# Patient Record
Sex: FEMALE | Race: BLACK OR AFRICAN AMERICAN | NOT HISPANIC OR LATINO | URBAN - METROPOLITAN AREA
[De-identification: names, ages, dates, MRNs, and addresses within clinical notes are randomized per-mention and may not be internally consistent; named-entity substitution may affect disease eponyms.]

---

## 2023-12-13 ENCOUNTER — HOSPITAL ENCOUNTER (EMERGENCY)
Facility: HOSPITAL | Age: 23
Discharge: HOME | End: 2023-12-13
Payer: COMMERCIAL

## 2023-12-13 VITALS
TEMPERATURE: 97.5 F | HEIGHT: 68 IN | HEART RATE: 75 BPM | RESPIRATION RATE: 16 BRPM | DIASTOLIC BLOOD PRESSURE: 100 MMHG | WEIGHT: 293 LBS | BODY MASS INDEX: 44.41 KG/M2 | OXYGEN SATURATION: 99 % | SYSTOLIC BLOOD PRESSURE: 154 MMHG

## 2023-12-13 DIAGNOSIS — S39.012D STRAIN OF LUMBAR REGION, SUBSEQUENT ENCOUNTER: Primary | ICD-10-CM

## 2023-12-13 PROCEDURE — 99282 EMERGENCY DEPT VISIT SF MDM: CPT

## 2023-12-13 PROCEDURE — 99283 EMERGENCY DEPT VISIT LOW MDM: CPT

## 2023-12-13 PROCEDURE — 99284 EMERGENCY DEPT VISIT MOD MDM: CPT | Performed by: PHYSICIAN ASSISTANT

## 2023-12-13 RX ORDER — KETOROLAC TROMETHAMINE 30 MG/ML
30 INJECTION, SOLUTION INTRAMUSCULAR; INTRAVENOUS ONCE
Status: DISCONTINUED | OUTPATIENT
Start: 2023-12-13 | End: 2023-12-13 | Stop reason: HOSPADM

## 2023-12-13 RX ORDER — LIDOCAINE 50 MG/G
1 PATCH TOPICAL DAILY
Qty: 5 PATCH | Refills: 0 | Status: SHIPPED | OUTPATIENT
Start: 2023-12-13

## 2023-12-13 ASSESSMENT — COLUMBIA-SUICIDE SEVERITY RATING SCALE - C-SSRS
2. HAVE YOU ACTUALLY HAD ANY THOUGHTS OF KILLING YOURSELF?: NO
6. HAVE YOU EVER DONE ANYTHING, STARTED TO DO ANYTHING, OR PREPARED TO DO ANYTHING TO END YOUR LIFE?: NO
1. IN THE PAST MONTH, HAVE YOU WISHED YOU WERE DEAD OR WISHED YOU COULD GO TO SLEEP AND NOT WAKE UP?: NO

## 2023-12-13 ASSESSMENT — PAIN SCALES - GENERAL: PAINLEVEL_OUTOF10: 10 - WORST POSSIBLE PAIN

## 2023-12-13 ASSESSMENT — PAIN - FUNCTIONAL ASSESSMENT: PAIN_FUNCTIONAL_ASSESSMENT: 0-10

## 2023-12-13 ASSESSMENT — PAIN DESCRIPTION - LOCATION: LOCATION: BACK

## 2023-12-13 ASSESSMENT — PAIN DESCRIPTION - PAIN TYPE: TYPE: ACUTE PAIN

## 2023-12-13 NOTE — ED TRIAGE NOTES
Pt to ED with c/o lower back pain. 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 Seen at [facility] and dc home with muscle relaxers. Still having low back pain. 10/10

## 2023-12-13 NOTE — ED PROVIDER NOTES
HPI   Chief Complaint   Patient presents with    Back Pain       HPI  This is a 23-year-old female who was injured at work last week.  It was last Friday.  She is was seen at the [facility] for the injury after she was brought by squad to [facility].  She was given imaging Naprosyn and Flexeril.  She states it felt like is getting little better but every time she twisted hurts.  She does not have a follow-up appointment with them until 2 weeks.  She has not been at work since then.  She has not done any other follow-up.  She denies any urinary changes denies being pregnant.  No chest pain shortness of breath cough cold fever numbness tingling or weakness.  Hurts mostly when she moves.                  [place] Coma Scale Score: 15                  Patient History   Past Medical History:   Diagnosis Date    38 weeks gestation of pregnancy 06/10/2019    38 weeks gestation of pregnancy    Anemia complicating pregnancy, third trimester 05/15/2019    Anemia during pregnancy in third trimester    Chlamydial infection, unspecified 10/23/2015    Chlamydia    Encounter for  screening for Streptococcus B 2019     screening for streptococcus B    Encounter for examination of blood pressure without abnormal findings 2019    Encounter for blood pressure examination    Encounter for other specified surgical aftercare 2019    Encounter for postoperative wound check    Encounter for other specified surgical aftercare 2016    Encounter for postoperative wound check    Encounter for routine postpartum follow-up 2019    Postpartum exam    Encounter for supervision of normal pregnancy, unspecified, unspecified trimester 2019    Encounter for prenatal care    Encounter for supervision of other normal pregnancy, third trimester 05/15/2019    Encounter for supervision of other normal pregnancy in third trimester    Encounter for supervision of other normal pregnancy,  unspecified trimester 2016    Pregnancy, subsequent    Excessive weight gain in pregnancy, unspecified trimester 2016    Excessive weight gain in pregnancy    Excessive weight gain in pregnancy, unspecified trimester 2016    Excessive weight gain in pregnancy    Maternal care for other (suspected) fetal abnormality and damage, fetal cardiac anomalies, not applicable or unspecified 2019    Abnormal fetal echocardiogram affecting antepartum care of mother    Obesity complicating pregnancy, unspecified trimester 2019    Obesity in pregnancy    Personal history of diseases of the blood and blood-forming organs and certain disorders involving the immune mechanism 2019    History of anemia    Personal history of other infectious and parasitic diseases 2016    History of chlamydia infection    Personal history of other medical treatment     History of blood transfusion    Personal history of other mental and behavioral disorders 2015    History of attention deficit hyperactivity disorder (ADHD)    Personal history of other specified conditions 10/23/2015    History of urinary frequency    Supervision of high risk pregnancy, unspecified, second trimester 2016    Supervision of high risk pregnancy in second trimester    Supervision of high risk pregnancy, unspecified, third trimester 2019    Encounter for supervision of high risk pregnancy in third trimester, antepartum    Unspecified maternal hypertension, unspecified trimester 2016    Hypertension in pregnancy, delivered    Unspecified maternal hypertension, unspecified trimester 2016    Hypertension in pregnancy, antepartum    Unspecified pre-existing hypertension complicating pregnancy, unspecified trimester 06/10/2019    Chronic hypertension during pregnancy, antepartum     Past Surgical History:   Procedure Laterality Date     SECTION, CLASSIC  2019     Section     No family  history on file.  Social History     Tobacco Use    Smoking status: Never    Smokeless tobacco: Never   Substance Use Topics    Alcohol use: Not on file    Drug use: Not on file       Physical Exam   ED Triage Vitals [12/13/23 0947]   Temp Heart Rate Resp BP   36.4 °C (97.5 °F) 75 16 (!) 154/100      SpO2 Temp Source Heart Rate Source Patient Position   99 % Temporal Monitor Sitting      BP Location FiO2 (%)     Left arm 21 %       Physical Exam  Reviewed family history social history and allergies and were noncontributory to current problem.    Review of systems as noted in history of present illness  otherwise negative. All other systems were reviewed and negative.     PMHX: Chronic conditions: reviewd in EMR, relevant history noted in HPI                Surgeries, hospitalizations: reviewed in EMR , relevant history noted in HPI                Medications: reviewed in EMR, relevant history noted in HPI                Allergies: reviewed in EMR, relevant history noted in HPI      PHYSICAL EXAM:    GENERAL/ CONSTITUTIONAL: Vitals noted, no distress. Alert and oriented  x 3. Non-toxic.  No Drooling or stridor .    HEAD: Normocephalic Atraumatic    EENT:  Posterior oropharynx unremarkable. No meningismus. No LAD.  No exudate present.      EYES: PERRLA EOMI     NECK: Supple. Nontender. No midline tenderness.  Full range of motion    CARDIAC: Regular, rate, rhythm. No murmurs rubs or gallops. No JVD    PULMONARY: Lungs clear bilaterally with good aeration. No wheezes rales or rhonchi. No respiratory distress.     GI: Soft, . Nontender. No peritoneal signs. Normoactive bowel sounds. No pulsatile masses.  No guarding or rebound    EXTREMITIES/MUSCULOSKELTAL: No peripheral edema. Negative Homans bilaterally, NVIT,  FROM in all extremities and equal.  Patient has some tenderness noted on the bilateral lumbosacral aspect.  No direct LS spine tenderness.  Full range of motion but has some tenderness with rotation noted  paravertebrals.  Paraspinals.    SKIN: No rash. Intact.     NEURO: No focal neurologic deficits,     PSYCH: appropriate mood and affect    MEDICAL DECISION MAKING:      ED Course & MDM   Diagnoses as of 12/13/23 1114   Strain of lumbar region, subsequent encounter     I am checking a urine as she states it was not checked previously when she was taking his medications.  Pregnancy ordered.  No other further workup needed at this time.  Toradol given.    Medical Decision Making  Home-going with lidocaine patches.  Encouraged to take the medicine that she was prescribed that she is only had 2 days worth of doses total and she did not start taking to later.  Epsom salts baths are helpful and she states she is taking those as well.  She left without official discharge before she gave a urine sample.  But she has already been taking the Naprosyn and Flexeril.  She does not believe she was pregnant.  Patient left without treatment complete.  She did not give a pregnancy test therefore she was not given Toradol.  She was instructed to follow-up as noted.  She was going to be home-going on lidocaine but she did not stay for any final disposition and paperwork prescriptions    Procedure  Procedures     Nahed Chaves PA-C  12/13/23 1116

## 2023-12-13 NOTE — DISCHARGE INSTRUCTIONS
CONTINUE REGIMEN NOTED FROM [facility];  MAY USE LIDOCAINE PATCH FOR COMFORT  FOLLOW UP WITH APPT IN ONE MORE WEEK as NOTED BY [facility]

## 2025-03-07 ENCOUNTER — HOSPITAL ENCOUNTER (EMERGENCY)
Facility: HOSPITAL | Age: 25
Discharge: HOME | End: 2025-03-07
Payer: COMMERCIAL

## 2025-03-07 VITALS
HEART RATE: 75 BPM | DIASTOLIC BLOOD PRESSURE: 89 MMHG | WEIGHT: 293 LBS | SYSTOLIC BLOOD PRESSURE: 177 MMHG | TEMPERATURE: 98 F | HEIGHT: 68 IN | BODY MASS INDEX: 44.41 KG/M2 | OXYGEN SATURATION: 96 % | RESPIRATION RATE: 16 BRPM

## 2025-03-07 DIAGNOSIS — Z11.3 SCREENING EXAMINATION FOR STI: ICD-10-CM

## 2025-03-07 DIAGNOSIS — L02.31 ABSCESS OF BUTTOCK, RIGHT: Primary | ICD-10-CM

## 2025-03-07 DIAGNOSIS — A59.9 TRICHOMONIASIS: ICD-10-CM

## 2025-03-07 LAB
C TRACH RRNA SPEC QL NAA+PROBE: NEGATIVE
CLUE CELLS SPEC QL WET PREP: ABNORMAL
N GONORRHOEA DNA SPEC QL PROBE+SIG AMP: NEGATIVE
PREGNANCY TEST URINE, POC: NEGATIVE
T VAGINALIS SPEC QL WET PREP: PRESENT
WBC VAG QL WET PREP: ABNORMAL
YEAST VAG QL WET PREP: ABNORMAL

## 2025-03-07 PROCEDURE — 87075 CULTR BACTERIA EXCEPT BLOOD: CPT | Performed by: NURSE PRACTITIONER

## 2025-03-07 PROCEDURE — 96372 THER/PROPH/DIAG INJ SC/IM: CPT | Performed by: NURSE PRACTITIONER

## 2025-03-07 PROCEDURE — 99284 EMERGENCY DEPT VISIT MOD MDM: CPT | Performed by: NURSE PRACTITIONER

## 2025-03-07 PROCEDURE — 99283 EMERGENCY DEPT VISIT LOW MDM: CPT | Mod: 25

## 2025-03-07 PROCEDURE — 10060 I&D ABSCESS SIMPLE/SINGLE: CPT | Performed by: NURSE PRACTITIONER

## 2025-03-07 PROCEDURE — 2500000001 HC RX 250 WO HCPCS SELF ADMINISTERED DRUGS (ALT 637 FOR MEDICARE OP): Mod: SE | Performed by: NURSE PRACTITIONER

## 2025-03-07 PROCEDURE — 81025 URINE PREGNANCY TEST: CPT | Performed by: NURSE PRACTITIONER

## 2025-03-07 PROCEDURE — 2500000004 HC RX 250 GENERAL PHARMACY W/ HCPCS (ALT 636 FOR OP/ED): Mod: SE | Performed by: NURSE PRACTITIONER

## 2025-03-07 PROCEDURE — 10061 I&D ABSCESS COMP/MULTIPLE: CPT | Performed by: NURSE PRACTITIONER

## 2025-03-07 PROCEDURE — 87070 CULTURE OTHR SPECIMN AEROBIC: CPT | Performed by: NURSE PRACTITIONER

## 2025-03-07 PROCEDURE — 87210 SMEAR WET MOUNT SALINE/INK: CPT | Performed by: NURSE PRACTITIONER

## 2025-03-07 PROCEDURE — 87491 CHLMYD TRACH DNA AMP PROBE: CPT | Performed by: NURSE PRACTITIONER

## 2025-03-07 RX ORDER — SULFAMETHOXAZOLE AND TRIMETHOPRIM 800; 160 MG/1; MG/1
1 TABLET ORAL 2 TIMES DAILY
Qty: 14 TABLET | Refills: 0 | Status: SHIPPED | OUTPATIENT
Start: 2025-03-07 | End: 2025-03-14

## 2025-03-07 RX ORDER — LIDOCAINE HYDROCHLORIDE AND EPINEPHRINE 10; 10 UG/ML; MG/ML
3 INJECTION, SOLUTION INFILTRATION; PERINEURAL ONCE
Status: COMPLETED | OUTPATIENT
Start: 2025-03-07 | End: 2025-03-07

## 2025-03-07 RX ORDER — IBUPROFEN 600 MG/1
600 TABLET ORAL ONCE
Status: COMPLETED | OUTPATIENT
Start: 2025-03-07 | End: 2025-03-07

## 2025-03-07 RX ORDER — DOXYCYCLINE 100 MG/1
100 CAPSULE ORAL 2 TIMES DAILY
Qty: 14 CAPSULE | Refills: 0 | Status: SHIPPED | OUTPATIENT
Start: 2025-03-07 | End: 2025-03-14

## 2025-03-07 RX ORDER — LIDOCAINE HYDROCHLORIDE 10 MG/ML
2 INJECTION, SOLUTION INFILTRATION; PERINEURAL ONCE
Status: COMPLETED | OUTPATIENT
Start: 2025-03-07 | End: 2025-03-07

## 2025-03-07 RX ORDER — METRONIDAZOLE 500 MG/1
2000 TABLET ORAL ONCE
Status: COMPLETED | OUTPATIENT
Start: 2025-03-07 | End: 2025-03-07

## 2025-03-07 RX ORDER — DOXYCYCLINE HYCLATE 100 MG
100 TABLET ORAL ONCE
Status: COMPLETED | OUTPATIENT
Start: 2025-03-07 | End: 2025-03-07

## 2025-03-07 RX ORDER — CEFTRIAXONE 1 G/1
1000 INJECTION, POWDER, FOR SOLUTION INTRAMUSCULAR; INTRAVENOUS ONCE
Status: COMPLETED | OUTPATIENT
Start: 2025-03-07 | End: 2025-03-07

## 2025-03-07 RX ADMIN — CEFTRIAXONE SODIUM 1000 MG: 1 INJECTION, POWDER, FOR SOLUTION INTRAMUSCULAR; INTRAVENOUS at 10:12

## 2025-03-07 RX ADMIN — DOXYCYCLINE HYCLATE 100 MG: 100 TABLET, COATED ORAL at 10:13

## 2025-03-07 RX ADMIN — METRONIDAZOLE 2000 MG: 500 TABLET ORAL at 10:13

## 2025-03-07 RX ADMIN — IBUPROFEN 600 MG: 600 TABLET, FILM COATED ORAL at 08:44

## 2025-03-07 RX ADMIN — LIDOCAINE HYDROCHLORIDE,EPINEPHRINE BITARTRATE 3 ML: 10; .01 INJECTION, SOLUTION INFILTRATION; PERINEURAL at 09:17

## 2025-03-07 RX ADMIN — LIDOCAINE HYDROCHLORIDE 2 ML: 10 INJECTION, SOLUTION INFILTRATION; PERINEURAL at 10:12

## 2025-03-07 ASSESSMENT — COLUMBIA-SUICIDE SEVERITY RATING SCALE - C-SSRS
2. HAVE YOU ACTUALLY HAD ANY THOUGHTS OF KILLING YOURSELF?: NO
1. IN THE PAST MONTH, HAVE YOU WISHED YOU WERE DEAD OR WISHED YOU COULD GO TO SLEEP AND NOT WAKE UP?: NO
6. HAVE YOU EVER DONE ANYTHING, STARTED TO DO ANYTHING, OR PREPARED TO DO ANYTHING TO END YOUR LIFE?: NO

## 2025-03-07 ASSESSMENT — ENCOUNTER SYMPTOMS
SHORTNESS OF BREATH: 0
ABDOMINAL PAIN: 0
NAUSEA: 0
FEVER: 0
CHILLS: 0
VOMITING: 0
DYSURIA: 0

## 2025-03-07 ASSESSMENT — PAIN - FUNCTIONAL ASSESSMENT
PAIN_FUNCTIONAL_ASSESSMENT: 0-10
PAIN_FUNCTIONAL_ASSESSMENT: 0-10

## 2025-03-07 ASSESSMENT — PAIN SCALES - GENERAL
PAINLEVEL_OUTOF10: 0 - NO PAIN
PAINLEVEL_OUTOF10: 5 - MODERATE PAIN

## 2025-03-07 NOTE — ED PROVIDER NOTES
HPI   Chief Complaint   Patient presents with    Abscess       HPI  This is a 24 year old  female with no significant medical history who presents to the Emergency Department today with complaints of a boil to her genital region. Reports the area becoming larger and slightly more painful over the last few days. Denies drainage. Denies any fever/chills, CP, SOB, n/v, abd pain, vaginal discharge, lesions or bleeding. Denies urinary symptoms. Is sexually active with 1 male partner. LMP 2/14. Denies concern for STI but wishes to be tested.     Review of Systems   Constitutional:  Negative for chills and fever.   Respiratory:  Negative for shortness of breath.    Cardiovascular:  Negative for chest pain.   Gastrointestinal:  Negative for abdominal pain, nausea and vomiting.   Genitourinary:  Negative for dysuria, genital sores, pelvic pain, vaginal bleeding and vaginal discharge.         Patient History   Past Medical History:   Diagnosis Date    38 weeks gestation of pregnancy ([facility]) 06/10/2019    38 weeks gestation of pregnancy    Anemia complicating pregnancy, third trimester ([facility]) 05/15/2019    Anemia during pregnancy in third trimester    Chlamydial infection, unspecified 10/23/2015    Chlamydia    Encounter for  screening for Streptococcus B ([facility]) 2019     screening for streptococcus B    Encounter for examination of blood pressure without abnormal findings 2019    Encounter for blood pressure examination    Encounter for other specified surgical aftercare 2019    Encounter for postoperative wound check    Encounter for other specified surgical aftercare 2016    Encounter for postoperative wound check    Encounter for routine postpartum follow-up ([facility]) 2019    Postpartum exam    Encounter for supervision of normal pregnancy, unspecified, unspecified trimester ([facility]) 2019    Encounter for prenatal care    Encounter for  supervision of other normal pregnancy, third trimester ([facility]) 05/15/2019    Encounter for supervision of other normal pregnancy in third trimester    Encounter for supervision of other normal pregnancy, unspecified trimester ([facility]) 04/21/2016    Pregnancy, subsequent    Excessive weight gain in pregnancy, unspecified trimester ([facility]) 03/17/2016    Excessive weight gain in pregnancy    Excessive weight gain in pregnancy, unspecified trimester ([facility]) 04/14/2016    Excessive weight gain in pregnancy    Maternal care for other (suspected) fetal abnormality and damage, fetal cardiac anomalies, not applicable or unspecified ([facility]) 04/12/2019    Abnormal fetal echocardiogram affecting antepartum care of mother    Obesity complicating pregnancy, unspecified trimester ([facility]) 09/25/2019    Obesity in pregnancy    Personal history of diseases of the blood and blood-forming organs and certain disorders involving the immune mechanism 08/29/2019    History of anemia    Personal history of other infectious and parasitic diseases 01/28/2016    History of chlamydia infection    Personal history of other medical treatment     History of blood transfusion    Personal history of other mental and behavioral disorders 12/30/2015    History of attention deficit hyperactivity disorder (ADHD)    Personal history of other specified conditions 10/23/2015    History of urinary frequency    Supervision of high risk pregnancy, unspecified, second trimester ([facility]) 04/26/2016    Supervision of high risk pregnancy in second trimester    Supervision of high risk pregnancy, unspecified, third trimester ([facility]) 04/02/2019    Encounter for supervision of high risk pregnancy in third trimester, antepartum    Unspecified maternal hypertension, unspecified trimester ([facility]) 05/13/2016    Hypertension in pregnancy, delivered    Unspecified maternal hypertension, unspecified trimester ([facility]) 05/09/2016    Hypertension in  pregnancy, antepartum    Unspecified pre-existing hypertension complicating pregnancy, unspecified trimester ([facility]) 06/10/2019    Chronic hypertension during pregnancy, antepartum     Past Surgical History:   Procedure Laterality Date     SECTION, CLASSIC  2019     Section     No family history on file.  Social History     Tobacco Use    Smoking status: Never    Smokeless tobacco: Never   Substance Use Topics    Alcohol use: Not on file    Drug use: Not on file       Physical Exam   ED Triage Vitals [25 0815]   Temperature Heart Rate Respirations BP   36.7 °C (98 °F) 75 16 177/89      Pulse Ox Temp src Heart Rate Source Patient Position   96 % -- -- --      BP Location FiO2 (%)     -- --       Physical Exam  Vitals reviewed. Exam conducted with a chaperone present.   Constitutional:       Appearance: She is not ill-appearing.   HENT:      Head: Normocephalic and atraumatic.   Cardiovascular:      Rate and Rhythm: Normal rate and regular rhythm.      Heart sounds: Normal heart sounds.   Pulmonary:      Effort: Pulmonary effort is normal. No respiratory distress.      Breath sounds: Normal breath sounds. No wheezing, rhonchi or rales.   Abdominal:      General: Bowel sounds are normal. There is no distension.      Palpations: Abdomen is soft.      Tenderness: There is no abdominal tenderness. There is no guarding or rebound.   Genitourinary:         Comments: Pelvic exam performed at bedside, cervix pink with closed os. There is moderate whitish vaginal discharge. No cervical motion or adnexal tenderness    ~3cm abscess to right buttocks. +tenderness. Indurated with fluctuance at center. No drainage. No surrounding streaking, warmth or erythema. No   Neurological:      Mental Status: She is alert.           ED Course & MDM   Diagnoses as of 25 1001   Abscess of buttock, right   Screening examination for STI   Trichomoniasis                 No data recorded     [place] Coma Scale  Score: 15 (03/07/25 0814 : Bianca Romeo RN)                           Medical Decision Making  The patient remains clinically stable while in the ED. 24 year old female presenting to the ED with abscess to her right buttocks x 4 days, becoming larger and more painful. No associated drainage, fevers. Denies any vaginal or urinary symptoms, however would like to be tested for STIs. Declined HIV, Syphilis, HCV testing. She is well appearing, resting  comfortably without distress. Urine hcg negative. Wet prep positive for trich. Cultures obtained and pending for gc/chlamydia. Results discussed with patient, given 2g Flagyl PO along with IM Ceftriaxone and Doxycyline in ED. Advised to abstain from unprotected sex.  ~3cm abscess to right buttocks with induration and fluctuance at center. No surrounding wamrth, streaking or erythema; no drainage. Given Ibuprofen for discomfort. I&D performed at bedside with moderate serosanguinous drainage, patient tolerated well. Area covered with 2x2 dressing. Given rx for Bactrim BID x 1 week and Doxycyline BID x 1 week for STI empiric treatment. Return precautions discussed. She did verbalize understanding.     Procedure  Incision and Drainage    Performed by: LYNDA Posey  Authorized by: LYNDA Posey    Consent:     Consent obtained:  Verbal    Consent given by:  Patient    Risks, benefits, and alternatives were discussed: yes      Alternatives discussed:  Delayed treatment  Universal protocol:     Procedure explained and questions answered to patient or proxy's satisfaction: yes      Immediately prior to procedure, a time out was called: yes      Patient identity confirmed:  Verbally with patient  Location:     Type:  Abscess    Size:  ~3cm    Location: right buttocks.  Pre-procedure details:     Skin preparation:  Povidone-iodine  Anesthesia:     Anesthesia method:  Local infiltration    Local anesthetic:  Lidocaine 1% WITH epi  Procedure type:      Complexity:  Simple  Procedure details:     Ultrasound guidance: no      Needle aspiration: no      Incision types:  Single straight    Incision depth:  Subcutaneous    Wound management:  Irrigated with saline    Drainage:  Serosanguinous    Drainage amount:  Moderate    Wound treatment:  Wound left open    Packing materials:  None  Post-procedure details:     Procedure completion:  Tolerated well, no immediate complications       MAGGIE Posey-CNP  03/07/25 1005

## 2025-03-07 NOTE — Clinical Note
Isabel Cordon was seen and treated in our emergency department on 3/7/2025.  She may return to work on 03/08/2025.       If you have any questions or concerns, please don't hesitate to call.      Cathryn Cabello, APRN-CNP

## 2025-03-09 LAB
B-LACTAMASE ORGANISM ISLT: POSITIVE
BACTERIA SPEC CULT: ABNORMAL
BACTERIA SPEC CULT: ABNORMAL
GRAM STN SPEC: ABNORMAL
GRAM STN SPEC: ABNORMAL

## 2025-03-10 ENCOUNTER — TELEPHONE (OUTPATIENT)
Dept: PHARMACY | Facility: HOSPITAL | Age: 25
End: 2025-03-10
Payer: COMMERCIAL

## 2025-03-10 NOTE — PROGRESS NOTES
EDPD Note: Negative Results    The EDPD Post-Discharge Team was called regarding Isabel Cordon  Chlamydia culture/result that was taken during their recent emergency room visit. I gave patient their results. The culture/result were negative and there were no other questions at this time.  Pt seen in ED for complaints of boil to her genital region. No other symptoms noted. Pt was discharged with Bactrim DS BID x 7 days and Doxycycline 100 mg BID x 7 days. Pt aware she can stop Doxy as Chlamydia result was negative.     If there are any other questions for the ED Post-Discharge Culture Follow Up Team, please contact [phone]. Fax: [phone].    Oscar Mcgovern RPh

## 2025-03-10 NOTE — PROGRESS NOTES
EDPD Note: Abx Reviewed and Warranted    I reviewed Isabel Cordon 's chart regarding a positive wound culture/result that was taken during their recent emergency room visit. The patient was not told about these results prior to leaving the emergency department. Therefore, patient was contacted and given appropriate education.     Pt seen ineD for complaints of boil to her genital region. No other symptoms noted. Pt was discharged with Bactrim DS BID x 7 days which is appropriate. Today pt stated she has not started abx, will  today.     Susceptibility data from last 90 days.  Collected Specimen Info Organism   03/07/25 Tissue/Biopsy from Skin/Superficial Abscess Mixed Anaerobic Bacteria     Mixed Gram-Positive Bacteria      Admission on 03/07/2025, Discharged on 03/07/2025   Component Date Value Ref Range Status    Preg Test, Ur 03/07/2025 Negative  Negative Final    Trichomonas 03/07/2025 Present (A)  None Seen Final    Clue Cells 03/07/2025 None Seen  None Seen Final    Yeast 03/07/2025 None Seen  None Seen Final    WBC 03/07/2025 3-8   Final    Neisseria gonorrhea,Amplified 03/07/2025 Negative  Negative Final    Chlamydia trachomatis, Amplified 03/07/2025 Negative  Negative Final    Tissue/Wound Culture/Smear 03/07/2025 (2+) Few Mixed Gram-Positive Bacteria   Final    Tissue/Wound Culture/Smear 03/07/2025 (4+) Abundant Mixed Anaerobic Bacteria   Final    Beta Lactamase (Cefinase) 03/07/2025 Positive   Final    Gram Stain 03/07/2025 (3+) Moderate Polymorphonuclear leukocytes (A)   Final    Gram Stain 03/07/2025 (2+) Few Gram positive cocci (A)   Final       No further follow up needed from EDPD Team.     If there are any other questions for the ED Post-Discharge Culture Follow Up Team, please contact [phone]. Fax: [phone].    Oscar Mcgovern RPh